# Patient Record
Sex: MALE | Race: BLACK OR AFRICAN AMERICAN | NOT HISPANIC OR LATINO | Employment: FULL TIME | URBAN - METROPOLITAN AREA
[De-identification: names, ages, dates, MRNs, and addresses within clinical notes are randomized per-mention and may not be internally consistent; named-entity substitution may affect disease eponyms.]

---

## 2024-11-12 ENCOUNTER — OCCUPATIONAL HEALTH (OUTPATIENT)
Dept: URGENT CARE | Facility: CLINIC | Age: 29
End: 2024-11-12

## 2024-11-12 DIAGNOSIS — Z02.83 ENCOUNTER FOR DRUG SCREENING: Primary | ICD-10-CM

## 2024-11-12 LAB
CTP QC/QA: YES
POC 5 PANEL DRUG SCREEN: NEGATIVE

## 2024-11-12 RX ORDER — RISPERIDONE 4 MG/1
4 TABLET ORAL NIGHTLY
COMMUNITY
Start: 2024-10-17

## 2025-03-24 ENCOUNTER — HOSPITAL ENCOUNTER (EMERGENCY)
Facility: HOSPITAL | Age: 30
Discharge: HOME | End: 2025-03-24
Attending: EMERGENCY MEDICINE

## 2025-03-24 VITALS
BODY MASS INDEX: 28.73 KG/M2 | TEMPERATURE: 98 F | DIASTOLIC BLOOD PRESSURE: 86 MMHG | OXYGEN SATURATION: 100 % | SYSTOLIC BLOOD PRESSURE: 136 MMHG | WEIGHT: 178 LBS | RESPIRATION RATE: 16 BRPM | HEART RATE: 61 BPM

## 2025-03-24 DIAGNOSIS — S43.005A SHOULDER DISLOCATION, LEFT, INITIAL ENCOUNTER: ICD-10-CM

## 2025-03-24 PROCEDURE — 99283 EMERGENCY DEPT VISIT LOW MDM: CPT | Mod: 25

## 2025-03-24 PROCEDURE — 23650 CLTX SHO DSLC W/MNPJ WO ANES: CPT | Mod: LT

## 2025-03-24 RX ORDER — PROPOFOL 10 MG/ML
120 VIAL (ML) INTRAVENOUS
Status: DISCONTINUED | OUTPATIENT
Start: 2025-03-24 | End: 2025-03-24

## 2025-03-24 RX ORDER — ONDANSETRON HYDROCHLORIDE 2 MG/ML
4 INJECTION, SOLUTION INTRAVENOUS
Status: DISCONTINUED | OUTPATIENT
Start: 2025-03-24 | End: 2025-03-24

## 2025-03-24 NOTE — ED NOTES
ERP at bedside.  Closed reduction performed by ERP with pt in prone position without use of mod sedation.  Confirmed by X-Ray.  Sling and sloth applied.  Pt with +LUE radial pulse and < 3 sec cap refill

## 2025-03-24 NOTE — ED PROVIDER NOTES
Encounter Date: 3/24/2025       History     Chief Complaint   Patient presents with    Shoulder Injury     Patient boxing and states he feels like he swung too hard with left arm, felt pop, and believes shoulder is out of place. Arm is bent, and pt leaning down on L side. Pt unable to flex or extend arm      29-year-old male right-hand dominant presents the emergency room for evaluation of left shoulder pain.  It occurred while he was boxing with friends.  He feels like he may have dislocated shoulder.  He has no history of dislocation.  He does not want to move the shoulder.  Has no weakness or numbness in the hands.  No other complaints at this time.      Review of patient's allergies indicates:  No Known Allergies  Past Medical History:   Diagnosis Date    Anxiety     Psychosis      No past surgical history on file.  Family History   Problem Relation Name Age of Onset    Hypertension Mother      No Known Problems Father      Cancer Maternal Grandmother      Cancer Maternal Grandfather       Social History[1]  Review of Systems   Constitutional:  Negative for fever.   Respiratory:  Negative for cough and shortness of breath.    Gastrointestinal:  Negative for nausea and vomiting.   Musculoskeletal:  Positive for arthralgias. Negative for back pain, myalgias and neck pain.   Skin:  Negative for rash and wound.   Neurological:  Negative for weakness, numbness and headaches.   Psychiatric/Behavioral:  Negative for agitation.        Physical Exam     Initial Vitals [03/24/25 0022]   BP Pulse Resp Temp SpO2   136/86 61 16 98 °F (36.7 °C) 100 %      MAP       --         Physical Exam    Nursing note and vitals reviewed.  Constitutional: He appears well-developed and well-nourished. No distress.   Neck: Neck supple.   Cardiovascular:  Normal rate, regular rhythm, normal heart sounds and intact distal pulses.           2+ radial pulse   Pulmonary/Chest: He has no wheezes. He has no rhonchi. He has no rales.   Abdominal:  Abdomen is soft. There is no abdominal tenderness. There is no rebound and no guarding.   Musculoskeletal:      Cervical back: Neck supple.      Comments: Tenderness with deformity over the left shoulder     Neurological: He is alert and oriented to person, place, and time.   5/5 grasp wrist extension finger abduction in the left arm         ED Course   Orthopedic Injury    Date/Time: 3/24/2025 3:17 AM    Performed by: Alvin Ruvalcaba MD  Authorized by: Alvin Ruvalcaba MD    Location procedure was performed:  [facility] EMERGENCY DEPARTMENT  Consent Done?:  Not Needed  Injury:     Injury location:  Shoulder    Location details:  Left shoulder    Injury type:  Dislocation      Pre-procedure assessment:     Neurovascular status: Neurovascularly intact      Distal perfusion: normal      Neurological function: normal      Range of motion: reduced      Local anesthesia used?: No      Patient sedated?: No        Selections made in this section will also lock the Injury type section above.:     Manipulation performed?: Yes      Reduction method:  Scapular manipulation    Reduction method:  Scapular manipulation    Reduction method:  Scapular manipulation    Reduction method:  Scapular manipulation    Reduction method:  Scapular manipulation    Reduction method:  Scapular manipulation    Reduction successful?: Yes      Confirmation: Reduction confirmed by x-ray      Immobilization:  Sling    Complications: No    Post-procedure assessment:     Neurovascular status: Neurovascularly intact      Distal perfusion: normal      Neurological function: normal      Range of motion: normal      Patient tolerance:  Patient tolerated the procedure well with no immediate complications    Labs Reviewed - No data to display       Imaging Results              X-Ray Shoulder Left 1 View (Final result)  Result time 03/24/25 01:25:24      Final result by Stefano Brito DO (03/24/25 01:25:24)                   Impression:      As  above.      Electronically signed by: Stefano Brito  Date:    03/24/2025  Time:    01:25               Narrative:    EXAMINATION:  XR SHOULDER 1 VIEW LEFT    CLINICAL HISTORY:  Unspecified dislocation of left shoulder joint, initial encounter    TECHNIQUE:  AP and scapular Y radiographs of the left shoulder.    COMPARISON:  Radiographs from earlier the same date.    FINDINGS:  Postreduction radiographs of the left shoulder demonstrate normal alignment of the glenohumeral joint.  There is no acute fracture seen.  No definite Hill-Sachs fracture.  The remaining osseous structures are intact.                                       X-Ray Shoulder 2 or More Views Left (Final result)  Result time 03/24/25 00:50:25      Final result by Stefano Brito DO (03/24/25 00:50:25)                   Impression:      Anterior left shoulder dislocation.      Electronically signed by: Stefano Brito  Date:    03/24/2025  Time:    00:50               Narrative:    EXAMINATION:  XR SHOULDER COMPLETE 2 OR MORE VIEWS LEFT    CLINICAL HISTORY:  ? left shoulder dislocation;    TECHNIQUE:  Two or three views of the left shoulder were performed.    COMPARISON:  None    FINDINGS:  There is anterior dislocation of the left glenohumeral joint.  There is no acute fracture seen.  No definite osseous Bankart or Hill-Sachs fracture is seen.  Remaining osseous structures are intact                                       Medications - No data to display  Medical Decision Making  Patient had an anterior inferior shoulder dislocation that was reduced using scapular manipulation.  No signs of Hill-Sachs deformity.  Patient stable for discharge with follow up to Orthopedics.  Neurovascularly intact pre and post.    Amount and/or Complexity of Data Reviewed  Radiology: ordered.                                      Clinical Impression:  Final diagnoses:  [S43.005A] Shoulder dislocation, left, initial encounter  [S43.005A] Shoulder dislocation, left,  initial encounter - post reduction          ED Disposition Condition    Discharge Stable          ED Prescriptions    None       Follow-up Information       Follow up With Specialties Details Why Contact Info    Jevon Gomes Jr., MD Hand Surgery, Orthopedic Surgery  I have referred you to the orthopedist.  Called to schedule follow up appointment [address]  [address]  [facility] [address] [phone]                   [1]   Social History  Tobacco Use    Smoking status: Every Day     Types: Vaping with nicotine    Smokeless tobacco: Never   Substance Use Topics    Alcohol use: No    Drug use: Not Currently     Types: Marijuana        Alvin Ruvalcaba MD  03/24/25 0318

## 2025-03-24 NOTE — DISCHARGE INSTRUCTIONS
You dislocated her shoulder.  You may have ligament damage to your left shoulder which can lead to a dislocation and be subsequent to a dislocation.  You need to follow up with the orthopedist.  You need to wear the sling over the next week.  You can take Tylenol and anti-inflammatories for pain.  I do not suggest boxing or working out until you are cleared by the orthopedist.  You very well may need some physical therapy